# Patient Record
Sex: FEMALE | URBAN - METROPOLITAN AREA
[De-identification: names, ages, dates, MRNs, and addresses within clinical notes are randomized per-mention and may not be internally consistent; named-entity substitution may affect disease eponyms.]

---

## 2019-06-27 ENCOUNTER — TRANSFERRED RECORDS (OUTPATIENT)
Dept: HEALTH INFORMATION MANAGEMENT | Facility: CLINIC | Age: 29
End: 2019-06-27

## 2019-07-03 ENCOUNTER — MEDICAL CORRESPONDENCE (OUTPATIENT)
Dept: HEALTH INFORMATION MANAGEMENT | Facility: CLINIC | Age: 29
End: 2019-07-03

## 2019-07-03 ENCOUNTER — TRANSFERRED RECORDS (OUTPATIENT)
Dept: HEALTH INFORMATION MANAGEMENT | Facility: CLINIC | Age: 29
End: 2019-07-03

## 2019-07-03 ENCOUNTER — TELEPHONE (OUTPATIENT)
Dept: OPHTHALMOLOGY | Facility: CLINIC | Age: 29
End: 2019-07-03

## 2019-07-03 NOTE — TELEPHONE ENCOUNTER
Left voicemail for patient with my direct number to contact to schedule.      Cece Infante on 7/3/2019 at 3:28 PM

## 2019-07-03 NOTE — TELEPHONE ENCOUNTER
[facility] Call Center    Phone Message    May a detailed message be left on voicemail: yes    Reason for Call: Other: Lucia called to follow up on appointment scheduling.  I checked the schedule and 1st available was August 1 in [facility]  It appears that the clinic has already been notified based on previous encounters- so this is more or less an FYI that the patient also called.     Action Taken: Message routed to:  [facility] ump eye

## 2019-07-03 NOTE — TELEPHONE ENCOUNTER
Facilitator working on referral  Ash Kerr RN 1:04 PM 07/03/19        [facility] Call Center    Phone Message    May a detailed message be left on voicemail: yes    Reason for Call: Other: Tatiana Garrido referring, optic neuritis of the R eye with pain. Would like patient seen within about a week if possible. Notes coming over for review.     Action Taken: Message routed to:  [facility] Eye

## 2019-07-16 DIAGNOSIS — H53.10 SUBJECTIVE VISUAL DISTURBANCE: Primary | ICD-10-CM

## 2020-09-08 ENCOUNTER — NURSE TRIAGE (OUTPATIENT)
Dept: NURSING | Facility: CLINIC | Age: 30
End: 2020-09-08

## 2020-09-08 NOTE — TELEPHONE ENCOUNTER
Looking for Dr Washington's phone number.    Found and given.    Shaunna Calderon, RN      Additional Information    General information question, no triage required and triager able to answer question    Protocols used: INFORMATION ONLY CALL-A-AH

## 2022-11-28 PROCEDURE — 88305 TISSUE EXAM BY PATHOLOGIST: CPT | Mod: TC,ORL | Performed by: INTERNAL MEDICINE

## 2022-11-28 PROCEDURE — 88305 TISSUE EXAM BY PATHOLOGIST: CPT | Mod: 26 | Performed by: PATHOLOGY

## 2022-11-29 ENCOUNTER — LAB REQUISITION (OUTPATIENT)
Dept: LAB | Facility: CLINIC | Age: 32
End: 2022-11-29
Payer: COMMERCIAL

## 2022-11-29 DIAGNOSIS — K31.89 OTHER DISEASES OF STOMACH AND DUODENUM: ICD-10-CM

## 2022-11-29 DIAGNOSIS — R12 HEARTBURN: ICD-10-CM

## 2022-11-29 DIAGNOSIS — R11.2 NAUSEA WITH VOMITING, UNSPECIFIED: ICD-10-CM

## 2022-11-29 DIAGNOSIS — K21.00 GASTRO-ESOPHAGEAL REFLUX DISEASE WITH ESOPHAGITIS, WITHOUT BLEEDING: ICD-10-CM

## 2022-11-29 DIAGNOSIS — R93.3 ABNORMAL FINDINGS ON DIAGNOSTIC IMAGING OF OTHER PARTS OF DIGESTIVE TRACT: ICD-10-CM

## 2022-11-29 DIAGNOSIS — K30 FUNCTIONAL DYSPEPSIA: ICD-10-CM

## 2022-11-29 DIAGNOSIS — K44.9 DIAPHRAGMATIC HERNIA WITHOUT OBSTRUCTION OR GANGRENE: ICD-10-CM

## 2022-11-30 LAB
PATH REPORT.COMMENTS IMP SPEC: NORMAL
PATH REPORT.COMMENTS IMP SPEC: NORMAL
PATH REPORT.FINAL DX SPEC: NORMAL
PATH REPORT.GROSS SPEC: NORMAL
PATH REPORT.MICROSCOPIC SPEC OTHER STN: NORMAL
PATH REPORT.RELEVANT HX SPEC: NORMAL
PHOTO IMAGE: NORMAL

## 2023-06-04 ENCOUNTER — HEALTH MAINTENANCE LETTER (OUTPATIENT)
Age: 33
End: 2023-06-04